# Patient Record
Sex: MALE | Race: WHITE | NOT HISPANIC OR LATINO | Employment: OTHER | URBAN - NONMETROPOLITAN AREA
[De-identification: names, ages, dates, MRNs, and addresses within clinical notes are randomized per-mention and may not be internally consistent; named-entity substitution may affect disease eponyms.]

---

## 2018-03-30 ENCOUNTER — TRANSCRIBE ORDERS (OUTPATIENT)
Dept: CARDIOLOGY | Facility: CLINIC | Age: 63
End: 2018-03-30

## 2018-03-30 DIAGNOSIS — R07.89 OTHER CHEST PAIN: Primary | ICD-10-CM

## 2018-03-30 DIAGNOSIS — I25.2 OLD MYOCARDIAL INFARCTION: ICD-10-CM

## 2018-04-12 ENCOUNTER — CONSULT (OUTPATIENT)
Dept: CARDIOLOGY | Facility: CLINIC | Age: 63
End: 2018-04-12

## 2018-04-12 VITALS
HEIGHT: 72 IN | DIASTOLIC BLOOD PRESSURE: 102 MMHG | WEIGHT: 248 LBS | BODY MASS INDEX: 33.59 KG/M2 | SYSTOLIC BLOOD PRESSURE: 160 MMHG | HEART RATE: 48 BPM

## 2018-04-12 DIAGNOSIS — E78.00 HYPERCHOLESTEREMIA: ICD-10-CM

## 2018-04-12 DIAGNOSIS — R07.89 CHEST FULLNESS: ICD-10-CM

## 2018-04-12 DIAGNOSIS — E11.9 TYPE 2 DIABETES MELLITUS WITHOUT COMPLICATION, WITHOUT LONG-TERM CURRENT USE OF INSULIN (HCC): ICD-10-CM

## 2018-04-12 DIAGNOSIS — M79.10 MYALGIA: ICD-10-CM

## 2018-04-12 DIAGNOSIS — R06.02 SHORTNESS OF BREATH: ICD-10-CM

## 2018-04-12 DIAGNOSIS — I10 ESSENTIAL HYPERTENSION: ICD-10-CM

## 2018-04-12 DIAGNOSIS — I25.9 IHD (ISCHEMIC HEART DISEASE): Primary | ICD-10-CM

## 2018-04-12 DIAGNOSIS — R01.1 MURMUR, CARDIAC: ICD-10-CM

## 2018-04-12 DIAGNOSIS — E88.81 METABOLIC SYNDROME: ICD-10-CM

## 2018-04-12 DIAGNOSIS — R00.1 BRADYCARDIA: ICD-10-CM

## 2018-04-12 PROBLEM — E88.810 METABOLIC SYNDROME: Status: ACTIVE | Noted: 2018-04-12

## 2018-04-12 PROCEDURE — 93000 ELECTROCARDIOGRAM COMPLETE: CPT | Performed by: INTERNAL MEDICINE

## 2018-04-12 PROCEDURE — 99244 OFF/OP CNSLTJ NEW/EST MOD 40: CPT | Performed by: INTERNAL MEDICINE

## 2018-04-12 RX ORDER — LISINOPRIL 10 MG/1
10 TABLET ORAL DAILY
COMMUNITY
End: 2018-04-12

## 2018-04-12 RX ORDER — ATORVASTATIN CALCIUM 40 MG/1
40 TABLET, FILM COATED ORAL DAILY
COMMUNITY

## 2018-04-12 RX ORDER — UBIDECARENONE 200 MG
200 CAPSULE ORAL DAILY
Qty: 90 CAPSULE | Refills: 3 | Status: SHIPPED | OUTPATIENT
Start: 2018-04-12 | End: 2018-07-12 | Stop reason: RX

## 2018-04-12 RX ORDER — NIACIN 500 MG/1
TABLET, EXTENDED RELEASE ORAL
COMMUNITY
End: 2018-04-12

## 2018-04-12 RX ORDER — ASPIRIN 81 MG/1
81 TABLET ORAL DAILY
COMMUNITY

## 2018-04-12 RX ORDER — LISINOPRIL 20 MG/1
20 TABLET ORAL DAILY
Qty: 90 TABLET | Refills: 3 | Status: SHIPPED | OUTPATIENT
Start: 2018-04-12

## 2018-04-12 RX ORDER — ATENOLOL 50 MG/1
50 TABLET ORAL DAILY
COMMUNITY

## 2018-04-12 NOTE — PROGRESS NOTES
CARDIAC COMPLAINTS  chest pressure/discomfort and dyspnea      Subjective   Corby Gauthier is a 62 y.o. male came in today for his initial cardiac evaluation.  He has history of hypertension, diabetes, hypercholesterolemia was diagnosed with ischemic heart disease in 2007.  At that time, he had symptoms of heartburn and shortness of breath.  He was seen by cardiologists from [place] on a Monday and underwent cardiac catheterization the next day.  He was found to have significant left main and 4 vessel disease.  He underwent emergency 4 vessel bypass surgery on the same day.  He has done very well after that without any problems.  He has been followed by the cardiologist from [place] until about 5 years ago.  His last cardiac workup was in 2013 which showed moderate hypertensive blood pressure response and no ischemia.  Recently he started noticing increasing shortness of breath which occurs mostly on exertion and the symptom seems to be progress to getting worse.  He also started noticing burning chest pain along with the fullness in the left part of the chest radiating to the left shoulder and arm.  This occurs anytime of the day with no precipitating factors.  It does occurs more on stress but occurs even at rest also.  He also has diffuse involving both the arms.  It is mostly in the upper part of the arm.  His labs which was done at your office showed the sugar was 147.  Total cholesterol 185,  and A1c was 7.5.  He quit smoking many years ago and he does have family history of ischemic heart disease.    Past Surgical History:   Procedure Laterality Date   • CARDIAC CATHETERIZATION  2007    @ [place].  and 4 more lesions   • CARDIOVASCULAR STRESS TEST  09/23/2013    @[facility]. CV[facility]. 8 Min. 85% THR. BP- 204/77. EF 63%. Negative.   • CORONARY ARTERY BYPASS GRAFT  2007    @ [place].  CABG       Current Outpatient Prescriptions   Medication Sig Dispense Refill   • aspirin 81 MG EC tablet Take 81 mg  by mouth Daily.     • atenolol (TENORMIN) 50 MG tablet Take 50 mg by mouth Daily.     • atorvastatin (LIPITOR) 40 MG tablet Take 40 mg by mouth Daily.     • metFORMIN (GLUCOPHAGE) 1000 MG tablet Take 1,000 mg by mouth 2 (Two) Times a Day With Meals.     • niacin (NIASPAN) 500 MG CR tablet 2 tabs daily     • Co-Enzyme Q10 200 MG capsule Take 200 mg by mouth Daily. 90 capsule 3   • lisinopril (PRINIVIL,ZESTRIL) 20 MG tablet Take 1 tablet by mouth Daily. 90 tablet 3     No current facility-administered medications for this visit.            ALLERGIES:  Review of patient's allergies indicates no known allergies.    Past Medical History:   Diagnosis Date   • Coronary artery disease     s/p 4 vessel CABG in    • Diabetes mellitus    • History of appendectomy    • Hyperlipidemia    • Hypertension        History   Smoking Status   • Former Smoker   • Quit date:    Smokeless Tobacco   • Never Used          Family History   Problem Relation Age of Onset   • Dementia Mother    • No Known Problems Sister    • Heart attack Brother      MI at 44    • Heart attack Maternal Grandfather      multile MI's,  at 54 with MI       Review of Systems   Constitution: Negative for decreased appetite and malaise/fatigue.   HENT: Negative for congestion and sore throat.    Eyes: Negative for blurred vision.   Cardiovascular: Positive for chest pain and dyspnea on exertion.   Respiratory: Positive for shortness of breath. Negative for snoring.    Endocrine: Negative for cold intolerance and heat intolerance.   Hematologic/Lymphatic: Negative for adenopathy. Does not bruise/bleed easily.   Skin: Negative for itching, nail changes and skin cancer.   Musculoskeletal: Positive for myalgias. Negative for arthritis.   Gastrointestinal: Positive for heartburn. Negative for abdominal pain and dysphagia.   Genitourinary: Negative for bladder incontinence and frequency.   Neurological: Negative for dizziness, light-headedness, seizures and  "vertigo.   Psychiatric/Behavioral: Negative for altered mental status.   Allergic/Immunologic: Negative for environmental allergies and hives.       Diabetes- Yes  Thyroid- normal    Objective     BP (!) 160/102 (BP Location: Right arm)   Pulse (!) 48   Ht 182.9 cm (72\")   Wt 112 kg (248 lb)   BMI 33.63 kg/m²     Physical Exam   Constitutional: He is oriented to person, place, and time. He appears well-developed and well-nourished.   HENT:   Head: Normocephalic.   Nose: Nose normal.   Eyes: EOM are normal. Pupils are equal, round, and reactive to light.   Neck: Normal range of motion. Neck supple.   Cardiovascular: Regular rhythm, S1 normal and S2 normal.  Bradycardia present.    Murmur heard.  Pulmonary/Chest: Effort normal and breath sounds normal.   Abdominal: Soft. Bowel sounds are normal.   Musculoskeletal: Normal range of motion. He exhibits no edema.   Neurological: He is alert and oriented to person, place, and time.   Skin: Skin is warm and dry.   Psychiatric: He has a normal mood and affect.         ECG 12 Lead  Date/Time: 4/12/2018 2:29 PM  Performed by: DAVID GOODRICH  Authorized by: DAVID GOODRICH   Previous ECG: no previous ECG available  Rhythm: sinus bradycardia  Rate: bradycardic  QRS axis: normal  Clinical impression: non-specific ECG              Assessment/Plan     Corby was seen today for establish care, shortness of breath and chest pain.    Diagnoses and all orders for this visit:    IHD (ischemic heart disease)  -     Stress Test With Myocardial Perfusion One Day; Future    Essential hypertension  -     lisinopril (PRINIVIL,ZESTRIL) 20 MG tablet; Take 1 tablet by mouth Daily.    Hypercholesteremia    Type 2 diabetes mellitus without complication, without long-term current use of insulin    Bradycardia    Shortness of breath  -     Adult Transthoracic Echo Complete W/ Cont if Necessary Per Protocol; Future    Murmur, cardiac  -     Adult Transthoracic Echo Complete W/ Cont if " Necessary Per Protocol; Future    Chest fullness  -     Stress Test With Myocardial Perfusion One Day; Future    Metabolic syndrome    Myalgia  -     Co-Enzyme Q10 200 MG capsule; Take 200 mg by mouth Daily.       At baseline is slightly bradycardic but hypertensive.  His BMI is 34.  His EKG shows sinus bradycardia with nonspecific ST-T changes.  Apparently his heart rate normally runs between 40-50 and is asymptomatic from that.  I did not change his beta blockers at this time.  I did advise him to increase the dose of lisinopril to 20 mg once a day.  Regarding the myalgia, it could be related to his medications.  I advised him to stop the niacin since most of the studies showed no beneficial effect.  I did put him on coenzyme Q10.  Regarding the chest pain, it is concerning for ischemic heart disease.  I scheduled him to undergo a stress test to evaluate his functional status, chronotropic response and to rule out ischemia.  He also need an echocardiogram to evaluate the LV function, valvular structures.  Based on the results of these tests, further conditions will be made.  I also talked to him about diet exercise and weight reduction.             Electronically signed by Jourdan Dickinson MD April 12, 2018 2:13 PM

## 2018-04-26 ENCOUNTER — HOSPITAL ENCOUNTER (OUTPATIENT)
Dept: CARDIOLOGY | Facility: HOSPITAL | Age: 63
Discharge: HOME | End: 2018-04-26
Attending: INTERNAL MEDICINE

## 2018-04-26 ENCOUNTER — OUTSIDE FACILITY SERVICE (OUTPATIENT)
Dept: CARDIOLOGY | Facility: CLINIC | Age: 63
End: 2018-04-26

## 2018-04-26 DIAGNOSIS — R06.02 SHORTNESS OF BREATH: ICD-10-CM

## 2018-04-26 DIAGNOSIS — R07.89 CHEST FULLNESS: ICD-10-CM

## 2018-04-26 DIAGNOSIS — I25.9 IHD (ISCHEMIC HEART DISEASE): ICD-10-CM

## 2018-04-26 DIAGNOSIS — R01.1 MURMUR, CARDIAC: ICD-10-CM

## 2018-04-26 LAB
MAXIMAL PREDICTED HEART RATE: 159 BPM
MAXIMAL PREDICTED HEART RATE: 159 BPM
STRESS TARGET HR: 135 BPM
STRESS TARGET HR: 135 BPM

## 2018-04-26 PROCEDURE — 93018 CV STRESS TEST I&R ONLY: CPT | Performed by: INTERNAL MEDICINE

## 2018-04-26 PROCEDURE — 93306 TTE W/DOPPLER COMPLETE: CPT | Performed by: INTERNAL MEDICINE

## 2018-04-26 PROCEDURE — 78452 HT MUSCLE IMAGE SPECT MULT: CPT | Performed by: INTERNAL MEDICINE

## 2018-04-26 PROCEDURE — 93306 TTE W/DOPPLER COMPLETE: CPT

## 2018-04-26 PROCEDURE — 93017 CV STRESS TEST TRACING ONLY: CPT

## 2018-04-26 PROCEDURE — 0 TECHNETIUM SESTAMIBI: Performed by: INTERNAL MEDICINE

## 2018-04-26 PROCEDURE — A9500 TC99M SESTAMIBI: HCPCS | Performed by: INTERNAL MEDICINE

## 2018-04-26 PROCEDURE — 78452 HT MUSCLE IMAGE SPECT MULT: CPT

## 2018-04-26 RX ADMIN — TECHNETIUM TC 99M SESTAMIBI 1 DOSE: 1 INJECTION INTRAVENOUS at 08:45

## 2018-05-01 ENCOUNTER — TELEPHONE (OUTPATIENT)
Dept: CARDIOLOGY | Facility: CLINIC | Age: 63
End: 2018-05-01

## 2018-05-01 RX ORDER — HYDROCHLOROTHIAZIDE 25 MG/1
25 TABLET ORAL DAILY
Qty: 90 TABLET | Refills: 3 | Status: SHIPPED | OUTPATIENT
Start: 2018-05-01

## 2018-05-01 NOTE — TELEPHONE ENCOUNTER
Patient aware of stress test and echo results and recommendations.  Negative for ischemia, normal LV function, echo good, hypertensive BP response.  Add HCTZ 25 mg once a day.  Script e-prescribed to [facility] mail order.

## 2018-07-12 ENCOUNTER — OFFICE VISIT (OUTPATIENT)
Dept: CARDIOLOGY | Facility: CLINIC | Age: 63
End: 2018-07-12

## 2018-07-12 VITALS
BODY MASS INDEX: 33.05 KG/M2 | HEART RATE: 64 BPM | HEIGHT: 72 IN | SYSTOLIC BLOOD PRESSURE: 128 MMHG | WEIGHT: 244 LBS | DIASTOLIC BLOOD PRESSURE: 80 MMHG

## 2018-07-12 DIAGNOSIS — I10 ESSENTIAL HYPERTENSION: ICD-10-CM

## 2018-07-12 DIAGNOSIS — E78.00 HYPERCHOLESTEREMIA: ICD-10-CM

## 2018-07-12 DIAGNOSIS — I25.9 IHD (ISCHEMIC HEART DISEASE): Primary | ICD-10-CM

## 2018-07-12 DIAGNOSIS — E88.81 METABOLIC SYNDROME: ICD-10-CM

## 2018-07-12 DIAGNOSIS — E11.9 TYPE 2 DIABETES MELLITUS WITHOUT COMPLICATION, WITHOUT LONG-TERM CURRENT USE OF INSULIN (HCC): ICD-10-CM

## 2018-07-12 PROCEDURE — 99213 OFFICE O/P EST LOW 20 MIN: CPT | Performed by: INTERNAL MEDICINE

## 2018-07-12 NOTE — PROGRESS NOTES
Chief Complaint   Patient presents with   • Follow-up     for cardiac management   • Med Refill     PCP refills meds and monitors labs.    • Medication Problem     pt said there was a problem with filling the Co Q 10, pt did by some OTC but rarely takes it       CARDIAC COMPLAINTS  Cardiac Management        Subjective   Corby Gauthier is a 62 y.o. male came in today for his follow-up visit.  He has history of ischemic heart disease diagnosed in 2007 when he underwent bypass surgery.  He was referred to me with chest discomfort, increasing shortness of breath.  Found to have elevated blood pressure.  His ace inhibitors were increased and he underwent a stress test and echocardiogram.  It showed normal LV function, hypertensive blood pressure response with no evidence of ischemia.  Hydrochlorothiazide was added.  He came today feeling much better.  He shortness of breath is better.  No more chest discomfort.  He has been trying to lose some weight.              Cardiac History  Past Surgical History:   Procedure Laterality Date   • CARDIAC CATHETERIZATION  03/13/2007    @ [place]. 60% LM, 100% LAD, 60% D1, 50% OM1, 50% OM2, 70% RCA, 80% PDA. EF 50%.   • CARDIOVASCULAR STRESS TEST  09/23/2013    @[facility]. CVA. 8 Min. 85% THR. BP- 204/77. EF 63%. Negative.   • CARDIOVASCULAR STRESS TEST  04/26/2018    8 Min,44 secs. 69% THR. BP- 207/71. Negative   • CORONARY ARTERY BYPASS GRAFT  03/13/2007    LIMA to LAD, SVG to OM1, SVG to OM3, SVG to PDA   • ECHO - CONVERTED  04/26/2018    EF 65%       Current Outpatient Prescriptions   Medication Sig Dispense Refill   • aspirin 81 MG EC tablet Take 81 mg by mouth Daily.     • atenolol (TENORMIN) 50 MG tablet Take 50 mg by mouth Daily.     • atorvastatin (LIPITOR) 40 MG tablet Take 40 mg by mouth Daily.     • hydrochlorothiazide (HYDRODIURIL) 25 MG tablet Take 1 tablet by mouth Daily. 90 tablet 3   • lisinopril (PRINIVIL,ZESTRIL) 20 MG tablet Take 1 tablet by mouth Daily. 90 tablet 3   •  metFORMIN (GLUCOPHAGE) 1000 MG tablet Take 1,000 mg by mouth 2 (Two) Times a Day With Meals.       No current facility-administered medications for this visit.        Allergies  :  Patient has no known allergies.       Past Medical History:   Diagnosis Date   • Coronary artery disease     s/p 4 vessel CABG in    • Diabetes mellitus (CMS/HCC)    • History of appendectomy    • Hyperlipidemia    • Hypertension        Social History     Social History   • Marital status:      Spouse name: N/A   • Number of children: N/A   • Years of education: N/A     Occupational History   • Not on file.     Social History Main Topics   • Smoking status: Former Smoker     Quit date:    • Smokeless tobacco: Never Used   • Alcohol use No   • Drug use: No   • Sexual activity: Not on file     Other Topics Concern   • Not on file     Social History Narrative   • No narrative on file       Family History   Problem Relation Age of Onset   • Dementia Mother    • No Known Problems Sister    • Heart attack Brother         MI at 44    • Heart attack Maternal Grandfather         multile MI's,  at 54 with MI       Review of Systems   Constitution: Negative for decreased appetite and malaise/fatigue.   HENT: Negative for congestion and sore throat.    Eyes: Negative for blurred vision.   Cardiovascular: Negative for chest pain and leg swelling.   Respiratory: Negative for shortness of breath and snoring.    Endocrine: Negative for cold intolerance and heat intolerance.   Hematologic/Lymphatic: Negative for adenopathy. Does not bruise/bleed easily.   Skin: Negative for itching, nail changes and skin cancer.   Musculoskeletal: Negative for arthritis and myalgias.   Gastrointestinal: Negative for abdominal pain, dysphagia and heartburn.   Genitourinary: Negative for bladder incontinence and frequency.   Neurological: Negative for dizziness, light-headedness, seizures and vertigo.   Psychiatric/Behavioral: Negative for altered mental  "status.   Allergic/Immunologic: Negative for environmental allergies and hives.       Diabetes- Yes  Thyroid- normal    Objective     /80   Pulse 64   Ht 182.9 cm (72\")   Wt 111 kg (244 lb)   BMI 33.09 kg/m²     Physical Exam   Constitutional: He is oriented to person, place, and time. He appears well-developed and well-nourished.   HENT:   Head: Normocephalic.   Nose: Nose normal.   Eyes: EOM are normal. Pupils are equal, round, and reactive to light.   Neck: Normal range of motion. Neck supple.   Cardiovascular: Normal rate, regular rhythm, S1 normal and S2 normal.    Murmur heard.  Pulmonary/Chest: Breath sounds normal.   Abdominal: Soft. Bowel sounds are normal.   Musculoskeletal: Normal range of motion. He exhibits no edema.   Neurological: He is alert and oriented to person, place, and time.   Skin: Skin is warm and dry.   Psychiatric: He has a normal mood and affect.     Procedures            Assessment/Plan   Patient's Body mass index is 33.09 kg/m². BMI is above normal parameters. Recommendations include: educational material, exercise counseling and nutrition counseling.     Corby was seen today for follow-up, med refill and medication problem.    Diagnoses and all orders for this visit:    IHD (ischemic heart disease)    Essential hypertension    Hypercholesteremia    Type 2 diabetes mellitus without complication, without long-term current use of insulin (CMS/[facility])    Metabolic syndrome       His heart rate and blood pressure appears much better.  His BMI did come down to 33.  I talked to him again about diet, exercise and weight reduction.  I gave him papers regarding the DASH diet.  I talked to him about the stress and echo findings.  Overall his cardiac status appears stable.  I advised him to talk you regarding the lipids.  Reassurance was given.  I will see him back in 6 months or sooner if needed.                  Electronically signed by Jourdan Dickinson MD July 12, 2018 4:14 PM      "

## 2018-07-12 NOTE — PATIENT INSTRUCTIONS
"DASH Eating Plan  DASH stands for \"Dietary Approaches to Stop Hypertension.\" The DASH eating plan is a healthy eating plan that has been shown to reduce high blood pressure (hypertension). It may also reduce your risk for type 2 diabetes, heart disease, and stroke. The DASH eating plan may also help with weight loss.  What are tips for following this plan?  General guidelines  · Avoid eating more than 2,300 mg (milligrams) of salt (sodium) a day. If you have hypertension, you may need to reduce your sodium intake to 1,500 mg a day.  · Limit alcohol intake to no more than 1 drink a day for nonpregnant women and 2 drinks a day for men. One drink equals 12 oz of beer, 5 oz of wine, or 1½ oz of hard liquor.  · Work with your health care provider to maintain a healthy body weight or to lose weight. Ask what an ideal weight is for you.  · Get at least 30 minutes of exercise that causes your heart to beat faster (aerobic exercise) most days of the week. Activities may include walking, swimming, or biking.  · Work with your health care provider or diet and nutrition specialist (dietitian) to adjust your eating plan to your individual calorie needs.  Reading food labels  · Check food labels for the amount of sodium per serving. Choose foods with less than 5 percent of the Daily Value of sodium. Generally, foods with less than 300 mg of sodium per serving fit into this eating plan.  · To find whole grains, look for the word \"whole\" as the first word in the ingredient list.  Shopping  · Buy products labeled as \"low-sodium\" or \"no salt added.\"  · Buy fresh foods. Avoid canned foods and premade or frozen meals.  Cooking  · Avoid adding salt when cooking. Use salt-free seasonings or herbs instead of table salt or sea salt. Check with your health care provider or pharmacist before using salt substitutes.  · Do not conti foods. Cook foods using healthy methods such as baking, boiling, grilling, and broiling instead.  · Cook with " heart-healthy oils, such as olive, canola, soybean, or sunflower oil.  Meal planning    · Eat a balanced diet that includes:  ? 5 or more servings of fruits and vegetables each day. At each meal, try to fill half of your plate with fruits and vegetables.  ? Up to 6-8 servings of whole grains each day.  ? Less than 6 oz of lean meat, poultry, or fish each day. A 3-oz serving of meat is about the same size as a deck of cards. One egg equals 1 oz.  ? 2 servings of low-fat dairy each day.  ? A serving of nuts, seeds, or beans 5 times each week.  ? Heart-healthy fats. Healthy fats called Omega-3 fatty acids are found in foods such as flaxseeds and coldwater fish, like sardines, salmon, and mackerel.  · Limit how much you eat of the following:  ? Canned or prepackaged foods.  ? Food that is high in trans fat, such as fried foods.  ? Food that is high in saturated fat, such as fatty meat.  ? Sweets, desserts, sugary drinks, and other foods with added sugar.  ? Full-fat dairy products.  · Do not salt foods before eating.  · Try to eat at least 2 vegetarian meals each week.  · Eat more home-cooked food and less restaurant, buffet, and fast food.  · When eating at a restaurant, ask that your food be prepared with less salt or no salt, if possible.  What foods are recommended?  The items listed may not be a complete list. Talk with your dietitian about what dietary choices are best for you.  Grains  Whole-grain or whole-wheat bread. Whole-grain or whole-wheat pasta. Brown rice. Oatmeal. Quinoa. Bulgur. Whole-grain and low-sodium cereals. Wilma bread. Low-fat, low-sodium crackers. Whole-wheat flour tortillas.  Vegetables  Fresh or frozen vegetables (raw, steamed, roasted, or grilled). Low-sodium or reduced-sodium tomato and vegetable juice. Low-sodium or reduced-sodium tomato sauce and tomato paste. Low-sodium or reduced-sodium canned vegetables.  Fruits  All fresh, dried, or frozen fruit. Canned fruit in natural juice (without  added sugar).  Meat and other protein foods  Skinless chicken or turkey. Ground chicken or turkey. Pork with fat trimmed off. Fish and seafood. Egg whites. Dried beans, peas, or lentils. Unsalted nuts, nut butters, and seeds. Unsalted canned beans. Lean cuts of beef with fat trimmed off. Low-sodium, lean deli meat.  Dairy  Low-fat (1%) or fat-free (skim) milk. Fat-free, low-fat, or reduced-fat cheeses. Nonfat, low-sodium ricotta or cottage cheese. Low-fat or nonfat yogurt. Low-fat, low-sodium cheese.  Fats and oils  Soft margarine without trans fats. Vegetable oil. Low-fat, reduced-fat, or light mayonnaise and salad dressings (reduced-sodium). Canola, safflower, olive, soybean, and sunflower oils. Avocado.  Seasoning and other foods  Herbs. Spices. Seasoning mixes without salt. Unsalted popcorn and pretzels. Fat-free sweets.  What foods are not recommended?  The items listed may not be a complete list. Talk with your dietitian about what dietary choices are best for you.  Grains  Baked goods made with fat, such as croissants, muffins, or some breads. Dry pasta or rice meal packs.  Vegetables  Creamed or fried vegetables. Vegetables in a cheese sauce. Regular canned vegetables (not low-sodium or reduced-sodium). Regular canned tomato sauce and paste (not low-sodium or reduced-sodium). Regular tomato and vegetable juice (not low-sodium or reduced-sodium). Pickles. Olives.  Fruits  Canned fruit in a light or heavy syrup. Fried fruit. Fruit in cream or butter sauce.  Meat and other protein foods  Fatty cuts of meat. Ribs. Fried meat. Bonner. Sausage. Bologna and other processed lunch meats. Salami. Fatback. Hotdogs. Bratwurst. Salted nuts and seeds. Canned beans with added salt. Canned or smoked fish. Whole eggs or egg yolks. Chicken or turkey with skin.  Dairy  Whole or 2% milk, cream, and half-and-half. Whole or full-fat cream cheese. Whole-fat or sweetened yogurt. Full-fat cheese. Nondairy creamers. Whipped toppings.  Processed cheese and cheese spreads.  Fats and oils  Butter. Stick margarine. Lard. Shortening. Ghee. Bonner fat. Tropical oils, such as coconut, palm kernel, or palm oil.  Seasoning and other foods  Salted popcorn and pretzels. Onion salt, garlic salt, seasoned salt, table salt, and sea salt. Worcestershire sauce. Tartar sauce. Barbecue sauce. Teriyaki sauce. Soy sauce, including reduced-sodium. Steak sauce. Canned and packaged gravies. Fish sauce. Oyster sauce. Cocktail sauce. Horseradish that you find on the shelf. Ketchup. Mustard. Meat flavorings and tenderizers. Bouillon cubes. Hot sauce and Tabasco sauce. Premade or packaged marinades. Premade or packaged taco seasonings. Relishes. Regular salad dressings.  Where to find more information:  · National Heart, Lung, and Blood [place]: www.nhlbi.nih.gov  · American Heart Association: www.heart.org  Summary  · The DASH eating plan is a healthy eating plan that has been shown to reduce high blood pressure (hypertension). It may also reduce your risk for type 2 diabetes, heart disease, and stroke.  · With the DASH eating plan, you should limit salt (sodium) intake to 2,300 mg a day. If you have hypertension, you may need to reduce your sodium intake to 1,500 mg a day.  · When on the DASH eating plan, aim to eat more fresh fruits and vegetables, whole grains, lean proteins, low-fat dairy, and heart-healthy fats.  · Work with your health care provider or diet and nutrition specialist (dietitian) to adjust your eating plan to your individual calorie needs.  This information is not intended to replace advice given to you by your health care provider. Make sure you discuss any questions you have with your health care provider.  Document Released: 12/06/2012 Document Revised: 12/11/2017 Document Reviewed: 12/11/2017  [employer] Interactive Patient Education © 2018 [employer]

## 2023-06-19 ENCOUNTER — OFFICE VISIT (OUTPATIENT)
Dept: CARDIOLOGY | Facility: CLINIC | Age: 68
End: 2023-06-19
Payer: MEDICARE

## 2023-06-19 VITALS
BODY MASS INDEX: 31.59 KG/M2 | DIASTOLIC BLOOD PRESSURE: 76 MMHG | HEIGHT: 72 IN | SYSTOLIC BLOOD PRESSURE: 110 MMHG | WEIGHT: 233.2 LBS | HEART RATE: 55 BPM

## 2023-06-19 DIAGNOSIS — E78.00 HYPERCHOLESTEREMIA: ICD-10-CM

## 2023-06-19 DIAGNOSIS — R06.02 SHORTNESS OF BREATH: ICD-10-CM

## 2023-06-19 DIAGNOSIS — I25.9 IHD (ISCHEMIC HEART DISEASE): Primary | ICD-10-CM

## 2023-06-19 DIAGNOSIS — R42 DIZZINESS: ICD-10-CM

## 2023-06-19 DIAGNOSIS — E11.9 TYPE 2 DIABETES MELLITUS WITHOUT COMPLICATION, WITHOUT LONG-TERM CURRENT USE OF INSULIN: ICD-10-CM

## 2023-06-19 DIAGNOSIS — I10 ESSENTIAL HYPERTENSION: ICD-10-CM

## 2023-06-19 DIAGNOSIS — I20.8 STABLE ANGINA PECTORIS: ICD-10-CM

## 2023-06-19 RX ORDER — ISOSORBIDE MONONITRATE 30 MG/1
30 TABLET, EXTENDED RELEASE ORAL DAILY
COMMUNITY

## 2023-06-19 NOTE — LETTER
June 19, 2023       No Recipients    Patient: Corby Gauthier   YOB: 1955   Date of Visit: 6/19/2023       Dear   No Recipients,    Thank you for referring oCrby Gauthier to me for evaluation. Below is a copy of my consult note.    If you have questions, please do not hesitate to call me. I look forward to following Corby along with you.         Sincerely,        ALISON Will        CC:   No Recipients    Subjective     Chief Complaint   Patient presents with   • Establish Care     Patient wishing to re-establish care, has CAD.   • Chest Pain     He reports starting Ozempic about 4 months ago, noticed some chest discomfort. Noticed intermittent discomfort for 2-3 weeks. Noticed a discomfort also in left arm.     Initial cardiac evaluation;    HPI    Corby Gauthier is a 67-year-old male with HTN, hyperlipidemia, diabetes and IHD diagnosed in 2007 when he underwent four-vessel CABG in [place].  He followed with cardiology there until he was referred here in 2018.  Stress test on 4/30/2018 showed good exercise tolerance, hypertensive blood pressure response, normal LVEF and no ischemia.  HCTZ 25 mg added.      He returns to today to reestablish care after not being seen since 2018.  He is retired from a very stressful job as  for [employer].  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  He is recently has noticed increasing chest pressure radiating to left arm.  Not related to exertion or eating.  Pain is mild to moderate.  Does not use sublingual nitro.  He started Ozempic around the same time, questioning possible relationship.  He was given Imdur by PCP which has improved symptoms somewhat.  Labs 1/20/2023 showed normal CBC, GFR greater than 60, normal electrolytes, A1c 7.9%, , , HDL 38, LDL 87.  He does not drink alcohol.  He quit smoking in 1992.    Cardiac History  Past Surgical History:   Procedure Laterality Date   • CARDIAC CATHETERIZATION  03/13/2007     @ [place]. 60% LM, 100% LAD, 60% D1, 50% OM1, 50% OM2, 70% RCA, 80% PDA. EF 50%.   • CARDIOVASCULAR STRESS TEST  2013    @[facility]. CVA. 8 Min. 85% THR. BP- 204/77. EF 63%. Negative.   • CARDIOVASCULAR STRESS TEST  2018    8 Min,44 secs. 69% THR. BP- 207/71. Negative   • CORONARY ARTERY BYPASS GRAFT  2007    LIMA to LAD, SVG to OM1, SVG to OM3, SVG to PDA   • ECHO - CONVERTED  2018    EF 65%     Current Outpatient Medications   Medication Sig Dispense Refill   • aspirin 81 MG EC tablet Take 1 tablet by mouth Daily.     • atenolol (TENORMIN) 50 MG tablet Take 1 tablet by mouth Daily.     • atorvastatin (LIPITOR) 40 MG tablet Take 1 tablet by mouth Daily.     • GLUCOSAMINE-CHONDROITIN PO Take  by mouth Daily.     • isosorbide mononitrate (IMDUR) 30 MG 24 hr tablet Take 1 tablet by mouth Daily.     • lisinopril (PRINIVIL,ZESTRIL) 20 MG tablet Take 1 tablet by mouth Daily. 90 tablet 3   • Probiotic Product (PROBIOTIC-10 PO) Take  by mouth Daily.     • Semaglutide (OZEMPIC, 0.25 OR 0.5 MG/DOSE, SC) Inject  under the skin into the appropriate area as directed 1 (One) Time Per Week.     • hydrochlorothiazide (HYDRODIURIL) 25 MG tablet Take 1 tablet by mouth Daily. 90 tablet 3     No current facility-administered medications for this visit.     Patient has no known allergies.    Past Medical History:   Diagnosis Date   • Coronary artery disease     s/p 4 vessel CABG in    • Diabetes mellitus    • History of appendectomy    • Hyperlipidemia    • Hypertension      Social History     Socioeconomic History   • Marital status:    Tobacco Use   • Smoking status: Former     Packs/day: 1.00     Years: 18.00     Pack years: 18.00     Types: Cigarettes     Quit date:      Years since quittin.4   • Smokeless tobacco: Never   Vaping Use   • Vaping Use: Never used   Substance and Sexual Activity   • Alcohol use: No   • Drug use: No   • Sexual activity: Defer     Family History   Problem Relation  "Age of Onset   • Hypertension Mother    • Dementia Mother    • Hypertension Father    • Hyperlipidemia Father    • Dementia Father    • No Known Problems Sister    • Heart attack Brother         MI at 46   • Hypertension Brother    • Heart attack Maternal Grandfather         multilelvia MCGRAW's,  at 57 with MI     Review of Systems   Constitutional: Positive for appetite change (Decreased with Ozempic) and fatigue.   HENT: Negative.    Eyes: Negative.    Respiratory: Positive for chest tightness.    Cardiovascular: Positive for chest pain and leg swelling (Left leg, significant varicosity). Negative for palpitations.   Gastrointestinal: Negative.    Endocrine: Negative.    Genitourinary: Negative.    Musculoskeletal: Positive for arthralgias (Knee pain).   Skin: Negative.    Allergic/Immunologic: Negative.    Neurological: Negative.    Hematological: Negative.    Psychiatric/Behavioral: Negative.    All other systems reviewed and are negative.      Diabetes- Yes  Thyroid- normal    Objective     /76 (BP Location: Left arm)   Pulse 55   Ht 182.9 cm (72\")   Wt 106 kg (233 lb 3.2 oz)   BMI 31.63 kg/m²     Physical Exam  Vitals and nursing note reviewed.   Constitutional:       Appearance: Normal appearance.   HENT:      Head: Normocephalic and atraumatic.      Right Ear: External ear normal.      Left Ear: External ear normal.      Nose: Nose normal.   Eyes:      General: No scleral icterus.     Extraocular Movements: Extraocular movements intact.      Pupils: Pupils are equal, round, and reactive to light.   Neck:      Vascular: No carotid bruit.   Cardiovascular:      Rate and Rhythm: Regular rhythm. Bradycardia present.      Pulses: Normal pulses.      Heart sounds: Murmur heard.   Pulmonary:      Effort: Pulmonary effort is normal.      Breath sounds: Normal breath sounds.   Abdominal:      General: Abdomen is flat.   Musculoskeletal:         General: No swelling.      Cervical back: Normal range of motion. "   Skin:     General: Skin is warm and dry.      Capillary Refill: Capillary refill takes less than 2 seconds.   Neurological:      General: No focal deficit present.      Mental Status: He is alert and oriented to person, place, and time.   Psychiatric:         Mood and Affect: Mood normal.         Behavior: Behavior normal.         ECG 12 Lead    Date/Time: 6/19/2023 10:36 AM  Performed by: Yudelka Gregg APRN  Authorized by: Yudelka Gregg APRN   Comparison: compared with previous ECG   Rhythm: sinus rhythm and sinus bradycardia  Rate: bradycardic  ST Segments: ST segments normal    Clinical impression: non-specific ECG  Comments:  ms  QRS 86 ms  QTc 401 ms            Assessment & Plan     Diagnoses and all orders for this visit:    1. IHD (ischemic heart disease) (Primary)  -     Stress Test With Myocardial Perfusion One Day; Future  -     Adult Transthoracic Echo Complete W/ Cont if Necessary Per Protocol; Future  -     Comprehensive Metabolic Panel; Future  -     CBC (No Diff); Future  -     US Carotid Bilateral; Future    2. Essential hypertension  -     Stress Test With Myocardial Perfusion One Day; Future  -     Adult Transthoracic Echo Complete W/ Cont if Necessary Per Protocol; Future  -     TSH; Future  -     Comprehensive Metabolic Panel; Future  -     CBC (No Diff); Future  -     US Carotid Bilateral; Future    3. Hypercholesteremia  -     Stress Test With Myocardial Perfusion One Day; Future  -     Adult Transthoracic Echo Complete W/ Cont if Necessary Per Protocol; Future  -     Lipid Panel; Future  -     High Sensitivity CRP; Future  -     Comprehensive Metabolic Panel; Future  -     CBC (No Diff); Future  -     US Carotid Bilateral; Future    4. Type 2 diabetes mellitus without complication, without long-term current use of insulin  -     Stress Test With Myocardial Perfusion One Day; Future  -     Adult Transthoracic Echo Complete W/ Cont if Necessary Per Protocol; Future  -     Hemoglobin A1c;  Future  -     Comprehensive Metabolic Panel; Future  -     CBC (No Diff); Future    5. Shortness of breath  -     Stress Test With Myocardial Perfusion One Day; Future  -     Adult Transthoracic Echo Complete W/ Cont if Necessary Per Protocol; Future  -     Comprehensive Metabolic Panel; Future  -     CBC (No Diff); Future    6. Stable angina pectoris  -     Stress Test With Myocardial Perfusion One Day; Future  -     Adult Transthoracic Echo Complete W/ Cont if Necessary Per Protocol; Future  -     Comprehensive Metabolic Panel; Future  -     CBC (No Diff); Future    7. Dizziness  -     US Carotid Bilateral; Future    Other orders  -     ECG 12 Lead       Clinical exam reveals normal S1, S2 with soft holosystolic murmur at the mitral area, slightly bradycardic at 55.  EKG showed sinus bradycardia with normal KS and QT intervals.  Nonspecific ST changes, flattening of the T wave.  Left radial pulse slightly weak.  No pedal edema.  Significant varicosity from knee to ankle on left lower extremity.    HTN-blood pressure well controlled at 110/76.  Continue atenolol, lisinopril; upon reviewing list, appears HCTZ has been discontinued.    Hypercholesterolemia- suboptimal control of lipids with elevated triglycerides at 263, Ozempic started for diabetes management.  Hopefully triglycerides have improved as well.  We will repeat lipids.  If triglycerides have increased further, will consider adding Zetia or fenofibrate.  Mediterranean style diet, increasing healthy fats encouraged.    Diabetes-A1c 7.9%.  Ozempic started.  He is on aspirin, statin, ACE.    CAD-s/p CABG x4 in 2007.  Negative stress test in 2018.  Imdur has been beneficial since starting.  Recommend nuclear stress test and echocardiogram to reassess LVEF, coronary artery perfusion, valvular structures.    Carotid ultrasound to rule out carotid or vertebral stenosis.  Will evaluate for retrograde flow indicating subclavian stenosis with weakened left radial  pulse.    No changes made today.  Further recommendations to follow.  We will see him back in 6 months or sooner if testing is abnormal.

## 2023-06-19 NOTE — PROGRESS NOTES
Subjective     Chief Complaint   Patient presents with   • Establish Care     Patient wishing to re-establish care, has CAD.   • Chest Pain     He reports starting Ozempic about 4 months ago, noticed some chest discomfort. Noticed intermittent discomfort for 2-3 weeks. Noticed a discomfort also in left arm.     Initial cardiac evaluation;    HPI    Corby Gauthier is a 67-year-old male with HTN, hyperlipidemia, diabetes and IHD diagnosed in 2007 when he underwent four-vessel CABG in [place].  He followed with cardiology there until he was referred here in 2018.  Stress test on 4/30/2018 showed good exercise tolerance, hypertensive blood pressure response, normal LVEF and no ischemia.  HCTZ 25 mg added.      He returns to today to reestablish care after not being seen since 2018.  He is retired from a very stressful job as  for [employer].  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  He is recently has noticed increasing chest pressure radiating to left arm.  Not related to exertion or eating.  Pain is mild to moderate.  Does not use sublingual nitro.  He started Ozempic around the same time, questioning possible relationship.  He was given Imdur by PCP which has improved symptoms somewhat.  Labs 1/20/2023 showed normal CBC, GFR greater than 60, normal electrolytes, A1c 7.9%, , , HDL 38, LDL 87.  He does not drink alcohol.  He quit smoking in 1992.    Cardiac History  Past Surgical History:   Procedure Laterality Date   • CARDIAC CATHETERIZATION  03/13/2007    @ [place]. 60% LM, 100% LAD, 60% D1, 50% OM1, 50% OM2, 70% RCA, 80% PDA. EF 50%.   • CARDIOVASCULAR STRESS TEST  09/23/2013    @[facility]. CVA. 8 Min. 85% THR. BP- 204/77. EF 63%. Negative.   • CARDIOVASCULAR STRESS TEST  04/26/2018    8 Min,44 secs. 69% THR. BP- 207/71. Negative   • CORONARY ARTERY BYPASS GRAFT  03/13/2007    LIMA to LAD, SVG to OM1, SVG to OM3, SVG to PDA   • ECHO - CONVERTED  04/26/2018    EF 65%     Current  Outpatient Medications   Medication Sig Dispense Refill   • aspirin 81 MG EC tablet Take 1 tablet by mouth Daily.     • atenolol (TENORMIN) 50 MG tablet Take 1 tablet by mouth Daily.     • atorvastatin (LIPITOR) 40 MG tablet Take 1 tablet by mouth Daily.     • GLUCOSAMINE-CHONDROITIN PO Take  by mouth Daily.     • isosorbide mononitrate (IMDUR) 30 MG 24 hr tablet Take 1 tablet by mouth Daily.     • lisinopril (PRINIVIL,ZESTRIL) 20 MG tablet Take 1 tablet by mouth Daily. 90 tablet 3   • Probiotic Product (PROBIOTIC-10 PO) Take  by mouth Daily.     • Semaglutide (OZEMPIC, 0.25 OR 0.5 MG/DOSE, SC) Inject  under the skin into the appropriate area as directed 1 (One) Time Per Week.     • hydrochlorothiazide (HYDRODIURIL) 25 MG tablet Take 1 tablet by mouth Daily. 90 tablet 3     No current facility-administered medications for this visit.     Patient has no known allergies.    Past Medical History:   Diagnosis Date   • Coronary artery disease     s/p 4 vessel CABG in    • Diabetes mellitus    • History of appendectomy    • Hyperlipidemia    • Hypertension      Social History     Socioeconomic History   • Marital status:    Tobacco Use   • Smoking status: Former     Packs/day: 1.00     Years: 18.00     Pack years: 18.00     Types: Cigarettes     Quit date:      Years since quittin.4   • Smokeless tobacco: Never   Vaping Use   • Vaping Use: Never used   Substance and Sexual Activity   • Alcohol use: No   • Drug use: No   • Sexual activity: Defer     Family History   Problem Relation Age of Onset   • Hypertension Mother    • Dementia Mother    • Hypertension Father    • Hyperlipidemia Father    • Dementia Father    • No Known Problems Sister    • Heart attack Brother         MI at 46   • Hypertension Brother    • Heart attack Maternal Grandfather         multile MI's,  at 57 with MI     Review of Systems   Constitutional: Positive for appetite change (Decreased with Ozempic) and fatigue.   HENT:  "Negative.    Eyes: Negative.    Respiratory: Positive for chest tightness.    Cardiovascular: Positive for chest pain and leg swelling (Left leg, significant varicosity). Negative for palpitations.   Gastrointestinal: Negative.    Endocrine: Negative.    Genitourinary: Negative.    Musculoskeletal: Positive for arthralgias (Knee pain).   Skin: Negative.    Allergic/Immunologic: Negative.    Neurological: Negative.    Hematological: Negative.    Psychiatric/Behavioral: Negative.    All other systems reviewed and are negative.      Diabetes- Yes  Thyroid- normal    Objective     /76 (BP Location: Left arm)   Pulse 55   Ht 182.9 cm (72\")   Wt 106 kg (233 lb 3.2 oz)   BMI 31.63 kg/m²     Physical Exam  Vitals and nursing note reviewed.   Constitutional:       Appearance: Normal appearance.   HENT:      Head: Normocephalic and atraumatic.      Right Ear: External ear normal.      Left Ear: External ear normal.      Nose: Nose normal.   Eyes:      General: No scleral icterus.     Extraocular Movements: Extraocular movements intact.      Pupils: Pupils are equal, round, and reactive to light.   Neck:      Vascular: No carotid bruit.   Cardiovascular:      Rate and Rhythm: Regular rhythm. Bradycardia present.      Pulses: Normal pulses.      Heart sounds: Murmur heard.   Pulmonary:      Effort: Pulmonary effort is normal.      Breath sounds: Normal breath sounds.   Abdominal:      General: Abdomen is flat.   Musculoskeletal:         General: No swelling.      Cervical back: Normal range of motion.   Skin:     General: Skin is warm and dry.      Capillary Refill: Capillary refill takes less than 2 seconds.   Neurological:      General: No focal deficit present.      Mental Status: He is alert and oriented to person, place, and time.   Psychiatric:         Mood and Affect: Mood normal.         Behavior: Behavior normal.         ECG 12 Lead    Date/Time: 6/19/2023 10:36 AM  Performed by: Yudelka Gregg, " ALISON  Authorized by: Yudelka Gregg APRN   Comparison: compared with previous ECG   Rhythm: sinus rhythm and sinus bradycardia  Rate: bradycardic  ST Segments: ST segments normal    Clinical impression: non-specific ECG  Comments:  ms  QRS 86 ms  QTc 401 ms            Assessment & Plan     Diagnoses and all orders for this visit:    1. IHD (ischemic heart disease) (Primary)  -     Stress Test With Myocardial Perfusion One Day; Future  -     Adult Transthoracic Echo Complete W/ Cont if Necessary Per Protocol; Future  -     Comprehensive Metabolic Panel; Future  -     CBC (No Diff); Future  -     US Carotid Bilateral; Future    2. Essential hypertension  -     Stress Test With Myocardial Perfusion One Day; Future  -     Adult Transthoracic Echo Complete W/ Cont if Necessary Per Protocol; Future  -     TSH; Future  -     Comprehensive Metabolic Panel; Future  -     CBC (No Diff); Future  -     US Carotid Bilateral; Future    3. Hypercholesteremia  -     Stress Test With Myocardial Perfusion One Day; Future  -     Adult Transthoracic Echo Complete W/ Cont if Necessary Per Protocol; Future  -     Lipid Panel; Future  -     High Sensitivity CRP; Future  -     Comprehensive Metabolic Panel; Future  -     CBC (No Diff); Future  -     US Carotid Bilateral; Future    4. Type 2 diabetes mellitus without complication, without long-term current use of insulin  -     Stress Test With Myocardial Perfusion One Day; Future  -     Adult Transthoracic Echo Complete W/ Cont if Necessary Per Protocol; Future  -     Hemoglobin A1c; Future  -     Comprehensive Metabolic Panel; Future  -     CBC (No Diff); Future    5. Shortness of breath  -     Stress Test With Myocardial Perfusion One Day; Future  -     Adult Transthoracic Echo Complete W/ Cont if Necessary Per Protocol; Future  -     Comprehensive Metabolic Panel; Future  -     CBC (No Diff); Future    6. Stable angina pectoris  -     Stress Test With Myocardial Perfusion One Day;  Future  -     Adult Transthoracic Echo Complete W/ Cont if Necessary Per Protocol; Future  -     Comprehensive Metabolic Panel; Future  -     CBC (No Diff); Future    7. Dizziness  -     US Carotid Bilateral; Future    Other orders  -     ECG 12 Lead       Clinical exam reveals normal S1, S2 with soft holosystolic murmur at the mitral area, slightly bradycardic at 55.  EKG showed sinus bradycardia with normal TX and QT intervals.  Nonspecific ST changes, flattening of the T wave.  Left radial pulse slightly weak.  No pedal edema.  Significant varicosity from knee to ankle on left lower extremity.    HTN-blood pressure well controlled at 110/76.  Continue atenolol, lisinopril; upon reviewing list, appears HCTZ has been discontinued.    Hypercholesterolemia- suboptimal control of lipids with elevated triglycerides at 263, Ozempic started for diabetes management.  Hopefully triglycerides have improved as well.  We will repeat lipids.  If triglycerides have increased further, will consider adding Zetia or fenofibrate.  Mediterranean style diet, increasing healthy fats encouraged.    Diabetes-A1c 7.9%.  Ozempic started.  He is on aspirin, statin, ACE.    CAD-s/p CABG x4 in 2007.  Negative stress test in 2018.  Imdur has been beneficial since starting.  Recommend nuclear stress test and echocardiogram to reassess LVEF, coronary artery perfusion, valvular structures.    Carotid ultrasound to rule out carotid or vertebral stenosis.  Will evaluate for retrograde flow indicating subclavian stenosis with weakened left radial pulse.    No changes made today.  Further recommendations to follow.  We will see him back in 6 months or sooner if testing is abnormal.

## 2023-06-19 NOTE — LETTER
June 19, 2023       No Recipients    Patient: Corby Gauthier   YOB: 1955   Date of Visit: 6/19/2023       Dear ALISON Schrader,    Thank you for referring Corby Gauthier to me for evaluation. Below is a copy of my consult note.    If you have questions, please do not hesitate to call me. I look forward to following Corby along with you.         Sincerely,        ALISON Will        CC:   No Recipients    Subjective     Chief Complaint   Patient presents with   • Establish Care     Patient wishing to re-establish care, has CAD.   • Chest Pain     He reports starting Ozempic about 4 months ago, noticed some chest discomfort. Noticed intermittent discomfort for 2-3 weeks. Noticed a discomfort also in left arm.     Initial cardiac evaluation;    HPI    Corby Gauthier is a 67-year-old male with HTN, hyperlipidemia, diabetes and IHD diagnosed in 2007 when he underwent four-vessel CABG in [place].  He followed with cardiology there until he was referred here in 2018.  Stress test on 4/30/2018 showed good exercise tolerance, hypertensive blood pressure response, normal LVEF and no ischemia.  HCTZ 25 mg added.      He returns to today to reestablish care after not being seen since 2018.  He is retired from a very stressful job as  for [employer].  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  He is recently has noticed increasing chest pressure radiating to left arm.  Not related to exertion or eating.  Pain is mild to moderate.  Does not use sublingual nitro.  He started Ozempic around the same time, questioning possible relationship.  He was given Imdur by PCP which has improved symptoms somewhat.  Labs 1/20/2023 showed normal CBC, GFR greater than 60, normal electrolytes, A1c 7.9%, , , HDL 38, LDL 87.  He does not drink alcohol.  He quit smoking in 1992.    Cardiac History  Past Surgical History:   Procedure Laterality Date   • CARDIAC CATHETERIZATION  03/13/2007     @ [place]. 60% LM, 100% LAD, 60% D1, 50% OM1, 50% OM2, 70% RCA, 80% PDA. EF 50%.   • CARDIOVASCULAR STRESS TEST  2013    @[facility]. CVA. 8 Min. 85% THR. BP- 204/77. EF 63%. Negative.   • CARDIOVASCULAR STRESS TEST  2018    8 Min,44 secs. 69% THR. BP- 207/71. Negative   • CORONARY ARTERY BYPASS GRAFT  2007    LIMA to LAD, SVG to OM1, SVG to OM3, SVG to PDA   • ECHO - CONVERTED  2018    EF 65%     Current Outpatient Medications   Medication Sig Dispense Refill   • aspirin 81 MG EC tablet Take 1 tablet by mouth Daily.     • atenolol (TENORMIN) 50 MG tablet Take 1 tablet by mouth Daily.     • atorvastatin (LIPITOR) 40 MG tablet Take 1 tablet by mouth Daily.     • GLUCOSAMINE-CHONDROITIN PO Take  by mouth Daily.     • isosorbide mononitrate (IMDUR) 30 MG 24 hr tablet Take 1 tablet by mouth Daily.     • lisinopril (PRINIVIL,ZESTRIL) 20 MG tablet Take 1 tablet by mouth Daily. 90 tablet 3   • Probiotic Product (PROBIOTIC-10 PO) Take  by mouth Daily.     • Semaglutide (OZEMPIC, 0.25 OR 0.5 MG/DOSE, SC) Inject  under the skin into the appropriate area as directed 1 (One) Time Per Week.     • hydrochlorothiazide (HYDRODIURIL) 25 MG tablet Take 1 tablet by mouth Daily. 90 tablet 3     No current facility-administered medications for this visit.     Patient has no known allergies.    Past Medical History:   Diagnosis Date   • Coronary artery disease     s/p 4 vessel CABG in    • Diabetes mellitus    • History of appendectomy    • Hyperlipidemia    • Hypertension      Social History     Socioeconomic History   • Marital status:    Tobacco Use   • Smoking status: Former     Packs/day: 1.00     Years: 18.00     Pack years: 18.00     Types: Cigarettes     Quit date:      Years since quittin.4   • Smokeless tobacco: Never   Vaping Use   • Vaping Use: Never used   Substance and Sexual Activity   • Alcohol use: No   • Drug use: No   • Sexual activity: Defer     Family History   Problem Relation  "Age of Onset   • Hypertension Mother    • Dementia Mother    • Hypertension Father    • Hyperlipidemia Father    • Dementia Father    • No Known Problems Sister    • Heart attack Brother         MI at 46   • Hypertension Brother    • Heart attack Maternal Grandfather         multilelvia MCGRAW's,  at 57 with MI     Review of Systems   Constitutional: Positive for appetite change (Decreased with Ozempic) and fatigue.   HENT: Negative.    Eyes: Negative.    Respiratory: Positive for chest tightness.    Cardiovascular: Positive for chest pain and leg swelling (Left leg, significant varicosity). Negative for palpitations.   Gastrointestinal: Negative.    Endocrine: Negative.    Genitourinary: Negative.    Musculoskeletal: Positive for arthralgias (Knee pain).   Skin: Negative.    Allergic/Immunologic: Negative.    Neurological: Negative.    Hematological: Negative.    Psychiatric/Behavioral: Negative.    All other systems reviewed and are negative.      Diabetes- Yes  Thyroid- normal    Objective     /76 (BP Location: Left arm)   Pulse 55   Ht 182.9 cm (72\")   Wt 106 kg (233 lb 3.2 oz)   BMI 31.63 kg/m²     Physical Exam  Vitals and nursing note reviewed.   Constitutional:       Appearance: Normal appearance.   HENT:      Head: Normocephalic and atraumatic.      Right Ear: External ear normal.      Left Ear: External ear normal.      Nose: Nose normal.   Eyes:      General: No scleral icterus.     Extraocular Movements: Extraocular movements intact.      Pupils: Pupils are equal, round, and reactive to light.   Neck:      Vascular: No carotid bruit.   Cardiovascular:      Rate and Rhythm: Regular rhythm. Bradycardia present.      Pulses: Normal pulses.      Heart sounds: Murmur heard.   Pulmonary:      Effort: Pulmonary effort is normal.      Breath sounds: Normal breath sounds.   Abdominal:      General: Abdomen is flat.   Musculoskeletal:         General: No swelling.      Cervical back: Normal range of motion. "   Skin:     General: Skin is warm and dry.      Capillary Refill: Capillary refill takes less than 2 seconds.   Neurological:      General: No focal deficit present.      Mental Status: He is alert and oriented to person, place, and time.   Psychiatric:         Mood and Affect: Mood normal.         Behavior: Behavior normal.         ECG 12 Lead    Date/Time: 6/19/2023 10:36 AM  Performed by: Yudelka Gregg APRN  Authorized by: Yudelka Gregg APRN   Comparison: compared with previous ECG   Rhythm: sinus rhythm and sinus bradycardia  Rate: bradycardic  ST Segments: ST segments normal    Clinical impression: non-specific ECG  Comments:  ms  QRS 86 ms  QTc 401 ms            Assessment & Plan     Diagnoses and all orders for this visit:    1. IHD (ischemic heart disease) (Primary)  -     Stress Test With Myocardial Perfusion One Day; Future  -     Adult Transthoracic Echo Complete W/ Cont if Necessary Per Protocol; Future  -     Comprehensive Metabolic Panel; Future  -     CBC (No Diff); Future  -     US Carotid Bilateral; Future    2. Essential hypertension  -     Stress Test With Myocardial Perfusion One Day; Future  -     Adult Transthoracic Echo Complete W/ Cont if Necessary Per Protocol; Future  -     TSH; Future  -     Comprehensive Metabolic Panel; Future  -     CBC (No Diff); Future  -     US Carotid Bilateral; Future    3. Hypercholesteremia  -     Stress Test With Myocardial Perfusion One Day; Future  -     Adult Transthoracic Echo Complete W/ Cont if Necessary Per Protocol; Future  -     Lipid Panel; Future  -     High Sensitivity CRP; Future  -     Comprehensive Metabolic Panel; Future  -     CBC (No Diff); Future  -     US Carotid Bilateral; Future    4. Type 2 diabetes mellitus without complication, without long-term current use of insulin  -     Stress Test With Myocardial Perfusion One Day; Future  -     Adult Transthoracic Echo Complete W/ Cont if Necessary Per Protocol; Future  -     Hemoglobin A1c;  Future  -     Comprehensive Metabolic Panel; Future  -     CBC (No Diff); Future    5. Shortness of breath  -     Stress Test With Myocardial Perfusion One Day; Future  -     Adult Transthoracic Echo Complete W/ Cont if Necessary Per Protocol; Future  -     Comprehensive Metabolic Panel; Future  -     CBC (No Diff); Future    6. Stable angina pectoris  -     Stress Test With Myocardial Perfusion One Day; Future  -     Adult Transthoracic Echo Complete W/ Cont if Necessary Per Protocol; Future  -     Comprehensive Metabolic Panel; Future  -     CBC (No Diff); Future    7. Dizziness  -     US Carotid Bilateral; Future    Other orders  -     ECG 12 Lead       Clinical exam reveals normal S1, S2 with soft holosystolic murmur at the mitral area, slightly bradycardic at 55.  EKG showed sinus bradycardia with normal NJ and QT intervals.  Nonspecific ST changes, flattening of the T wave.  Left radial pulse slightly weak.  No pedal edema.  Significant varicosity from knee to ankle on left lower extremity.    HTN-blood pressure well controlled at 110/76.  Continue atenolol, lisinopril, HCTZ.    Hypercholesterolemia- suboptimal control of lipids with elevated triglycerides at 263, Ozempic started for diabetes management.  Hopefully triglycerides have improved as well.  We will repeat lipids.  If triglycerides have increased further, will consider adding Zetia or fenofibrate.  Mediterranean style diet, increasing healthy fats encouraged.    Diabetes-A1c 7.9%.  Ozempic started.  He is on aspirin, statin, ACE.    CAD-s/p CABG x4 in 2007.  Negative stress test in 2018.  Imdur has been beneficial since starting.  Recommend nuclear stress test and echocardiogram to reassess LVEF, coronary artery perfusion, valvular structures.    Carotid ultrasound to rule out carotid or vertebral stenosis.  Will evaluate for retrograde flow indicating subclavian stenosis with weakened left radial pulse.    No changes made today.  Further  recommendations to follow.  We will see him back in 6 months or sooner if testing is abnormal.

## 2024-09-12 ENCOUNTER — TELEPHONE (OUTPATIENT)
Dept: CARDIOLOGY | Facility: CLINIC | Age: 69
End: 2024-09-12
Payer: MEDICARE

## 2024-09-12 DIAGNOSIS — I25.9 IHD (ISCHEMIC HEART DISEASE): Primary | ICD-10-CM

## 2024-09-12 DIAGNOSIS — I10 ESSENTIAL HYPERTENSION: ICD-10-CM

## 2024-09-12 DIAGNOSIS — R06.02 SHORTNESS OF BREATH: ICD-10-CM

## 2024-09-27 NOTE — PROGRESS NOTES
Chief Complaint   Patient presents with    Follow-up     For cardiac management, Pt states that is SOA but denies having and chest pains, dizziness,palpitations. Labs done August 28th 2024 in chart. Pt had stents put in 5 weeks ago     Med Refill     90 day refills on cardiac medications to [facility]. Pt takes aspirin        Cardiac Complaints  dyspnea      Subjective   Corby Gauthier is a 69 y.o. male with HTN, hyperlipidemia, diabetes, and IHD diagnosed in 2007 when he underwent four-vessel CABG in [place].  He followed with cardiology there until he was referred here in 2018.  Stress test on 4/30/2018 showed good exercise tolerance, hypertensive blood pressure response, normal LVEF and no ischemia.  HCTZ 25 mg added.  Workup 2023 showed apical ischemia, norvasc added, cath advised with persistent symptoms. He had not been seen since 2023 but went to [facility] 8/2024 with intermittent chest pain, EKG showed ST elevation in inferolateral wall. He then went to the cath lab where only the NAYAN to LAD was patent. The RCA had a total occlusion past PDA with no collateral filling and scar noted. The left main had 80% stenosis of left circumflex and a proximal 80% to 85% stenosis. The left main and left circumflex was stented, the chest pain improved and EKG showed no ST elevation.  Cath 8/26/2024 EF 50-55%, left main 80% reduced to 0% after a 4.5x22mm lashon stent from the left main into the proximal left circumflex, chronic total occlusion of RCA. Echo post cath 8/27/2024 showed EF 60%, mild MR.    Patient comes today for follow up and admits to SOA. No CP, dizziness, palpitations, or syncope noted.   He does have SOA, but reports some SOA but nothing significant, not taking Brilinta with caffeine. Labs 8/2024 showed: HH 13.4/41.2, Trop I >25,000, Na 136, K 4.2, BUN 14, Creatinine 1.16, GFR 69, LDL 98, HDL 45, TSH 2.957. Refills requested for 90 day supply.      Cardiac History  Past Surgical History:   Procedure  Laterality Date    CARDIAC CATHETERIZATION  03/13/2007    @ [place]. 60% LM, 100% LAD, 60% D1, 50% OM1, 50% OM2, 70% RCA, 80% PDA. EF 50%.    CARDIAC CATHETERIZATION  08/26/2024    EF 55%, left main 80% into proximal left circumflex, stented with 4.5x22mm lashon stent    CARDIOVASCULAR STRESS TEST  09/23/2013    @[facility]. CVA. 8 Min. 85% THR. BP- 204/77. EF 63%. Negative.    CARDIOVASCULAR STRESS TEST  04/26/2018    8 Min,44 secs. 69% THR. BP- 207/71. Negative    CARDIOVASCULAR STRESS TEST  07/06/2023    Lexiscan- EF 61%. 176/102. Apical Ischemia    CORONARY ARTERY BYPASS GRAFT  03/13/2007    LIMA to LAD, SVG to OM1, SVG to OM3, SVG to PDA    ECHO - CONVERTED  04/26/2018    EF 65%    ECHO - CONVERTED  07/06/2023    TLS. EF 60%. LA- 4.3. Trace-Mild MR    ECHO - CONVERTED  08/27/2024    EF 60%, mild MR       Current Outpatient Medications   Medication Sig Dispense Refill    aspirin 81 MG EC tablet Take 1 tablet by mouth Daily.      Brilinta 90 MG tablet tablet Take 1 tablet by mouth 2 (Two) Times a Day. 180 tablet 2    GLUCOSAMINE-CHONDROITIN PO Take  by mouth Daily.      isosorbide mononitrate (IMDUR) 30 MG 24 hr tablet Take 1 tablet by mouth Daily. 90 tablet 2    levothyroxine (SYNTHROID, LEVOTHROID) 25 MCG tablet Take 1 tablet by mouth Every Morning.      metoprolol succinate XL (TOPROL-XL) 25 MG 24 hr tablet Take 1 tablet by mouth Daily. 90 tablet 2    Probiotic Product (PROBIOTIC-10 PO) Take  by mouth Daily.      Semaglutide (OZEMPIC, 0.25 OR 0.5 MG/DOSE, SC) Inject  under the skin into the appropriate area as directed 1 (One) Time Per Week.      atorvastatin (LIPITOR) 80 MG tablet Take 1 tablet by mouth Daily. 90 tablet 2     No current facility-administered medications for this visit.       Patient has no known allergies.    Past Medical History:   Diagnosis Date    Coronary artery disease     s/p 4 vessel CABG in 2007    Diabetes mellitus     History of appendectomy     Hyperlipidemia     Hypertension        Social  "History     Socioeconomic History    Marital status:    Tobacco Use    Smoking status: Former     Current packs/day: 0.00     Average packs/day: 1 pack/day for 18.0 years (18.0 ttl pk-yrs)     Types: Cigarettes     Start date:      Quit date:      Years since quittin.7    Smokeless tobacco: Never   Vaping Use    Vaping status: Never Used   Substance and Sexual Activity    Alcohol use: No    Drug use: No    Sexual activity: Defer       Family History   Problem Relation Age of Onset    Hypertension Mother     Dementia Mother     Hypertension Father     Hyperlipidemia Father     Dementia Father     No Known Problems Sister     Heart attack Brother         MI at 46    Hypertension Brother     Heart attack Maternal Grandfather         joselin MI's,  at 57 with MI       Review of Systems   Constitutional: Negative for malaise/fatigue and night sweats.   Cardiovascular:  Positive for dyspnea on exertion. Negative for chest pain, claudication, irregular heartbeat, leg swelling, near-syncope, orthopnea, palpitations and syncope.   Respiratory:  Positive for shortness of breath. Negative for cough and wheezing.    Musculoskeletal:  Positive for stiffness. Negative for back pain and joint pain.   Gastrointestinal:  Negative for anorexia, heartburn, nausea and vomiting.   Genitourinary:  Negative for dysuria, hematuria, hesitancy and nocturia.   Neurological:  Negative for dizziness, headaches and light-headedness.   Psychiatric/Behavioral:  Negative for depression and memory loss. The patient is not nervous/anxious.            Objective     /60 (BP Location: Left arm)   Pulse 62   Ht 182.9 cm (72\")   Wt 106 kg (233 lb)   BMI 31.60 kg/m²     Constitutional:       Appearance: Not in distress.   Eyes:      Pupils: Pupils are equal, round, and reactive to light.   HENT:      Nose: Nose normal.   Pulmonary:      Effort: Pulmonary effort is normal.      Breath sounds: Normal breath sounds. "   Cardiovascular:      PMI at left midclavicular line. Normal rate. Regular rhythm.      Murmurs: There is a systolic murmur.   Abdominal:      Palpations: Abdomen is soft.   Musculoskeletal: Normal range of motion.      Cervical back: Normal range of motion and neck supple. Skin:     General: Skin is warm and dry.   Neurological:      Mental Status: Alert.         Procedures         Diagnoses and all orders for this visit:    1. IHD (ischemic heart disease) (Primary)    2. Essential hypertension    3. Shortness of breath    4. Hypercholesteremia    5. Type 2 diabetes mellitus without complication, without long-term current use of insulin    6. Obesity (BMI 30.0-34.9)    Other orders  -     Discontinue: atorvastatin (LIPITOR) 40 MG tablet; Take 2 tablets by mouth Daily.  Dispense: 90 tablet; Refill: 2  -     Brilinta 90 MG tablet tablet; Take 1 tablet by mouth 2 (Two) Times a Day.  Dispense: 180 tablet; Refill: 2  -     isosorbide mononitrate (IMDUR) 30 MG 24 hr tablet; Take 1 tablet by mouth Daily.  Dispense: 90 tablet; Refill: 2  -     metoprolol succinate XL (TOPROL-XL) 25 MG 24 hr tablet; Take 1 tablet by mouth Daily.  Dispense: 90 tablet; Refill: 2  -     atorvastatin (LIPITOR) 80 MG tablet; Take 1 tablet by mouth Daily.  Dispense: 90 tablet; Refill: 2             IHD: Recent stenting after MI this summer. Will continue DAPT. Bleed and bruise denied. Continue imdur for chest pain management. Will be urged to take his Brilinta     HTN: BP is stable. Continue BB at same.    Hyperlipidemia: Taking statin therapy with lipitor. Will continue same, increased at [facility]. LDL less than 100. Limited carb diet urged.    DM: Taking ozempic therapy. AIC with you. Limited carb diet urged.    Refills per request    BMI noted at 31.6, good cardiac ADA diet urged.    6 month follow up urged, sooner if needed.              Problems Addressed this Visit          Cardiac and Vasculature    IHD (ischemic heart disease) - Primary     Relevant Medications    Brilinta 90 MG tablet tablet    isosorbide mononitrate (IMDUR) 30 MG 24 hr tablet    metoprolol succinate XL (TOPROL-XL) 25 MG 24 hr tablet    Essential hypertension    Relevant Medications    metoprolol succinate XL (TOPROL-XL) 25 MG 24 hr tablet    Hypercholesteremia    Relevant Medications    atorvastatin (LIPITOR) 80 MG tablet       Endocrine and Metabolic    Type 2 diabetes mellitus without complication, without long-term current use of insulin       Pulmonary and Pneumonias    Shortness of breath     Other Visit Diagnoses       Obesity (BMI 30.0-34.9)              Diagnoses         Codes Comments    IHD (ischemic heart disease)    -  Primary ICD-10-CM: I25.9  ICD-9-CM: 414.9     Essential hypertension     ICD-10-CM: I10  ICD-9-CM: 401.9     Shortness of breath     ICD-10-CM: R06.02  ICD-9-CM: 786.05     Hypercholesteremia     ICD-10-CM: E78.00  ICD-9-CM: 272.0     Type 2 diabetes mellitus without complication, without long-term current use of insulin     ICD-10-CM: E11.9  ICD-9-CM: 250.00     Obesity (BMI 30.0-34.9)     ICD-10-CM: E66.9  ICD-9-CM: 278.00                        Electronically signed by Josie Garvey, ALISON September 30, 2024 08:44 EDT

## 2024-09-30 ENCOUNTER — OFFICE VISIT (OUTPATIENT)
Dept: CARDIOLOGY | Facility: CLINIC | Age: 69
End: 2024-09-30
Payer: MEDICARE

## 2024-09-30 VITALS
HEIGHT: 72 IN | SYSTOLIC BLOOD PRESSURE: 105 MMHG | BODY MASS INDEX: 31.56 KG/M2 | DIASTOLIC BLOOD PRESSURE: 60 MMHG | HEART RATE: 62 BPM | WEIGHT: 233 LBS

## 2024-09-30 DIAGNOSIS — E11.9 TYPE 2 DIABETES MELLITUS WITHOUT COMPLICATION, WITHOUT LONG-TERM CURRENT USE OF INSULIN: ICD-10-CM

## 2024-09-30 DIAGNOSIS — E66.811 OBESITY (BMI 30.0-34.9): ICD-10-CM

## 2024-09-30 DIAGNOSIS — R06.02 SHORTNESS OF BREATH: ICD-10-CM

## 2024-09-30 DIAGNOSIS — E78.00 HYPERCHOLESTEREMIA: ICD-10-CM

## 2024-09-30 DIAGNOSIS — I10 ESSENTIAL HYPERTENSION: ICD-10-CM

## 2024-09-30 DIAGNOSIS — I25.9 IHD (ISCHEMIC HEART DISEASE): Primary | ICD-10-CM

## 2024-09-30 PROCEDURE — 3074F SYST BP LT 130 MM HG: CPT | Performed by: NURSE PRACTITIONER

## 2024-09-30 PROCEDURE — 3078F DIAST BP <80 MM HG: CPT | Performed by: NURSE PRACTITIONER

## 2024-09-30 PROCEDURE — 99214 OFFICE O/P EST MOD 30 MIN: CPT | Performed by: NURSE PRACTITIONER

## 2024-09-30 RX ORDER — ATORVASTATIN CALCIUM 80 MG/1
80 TABLET, FILM COATED ORAL DAILY
Qty: 90 TABLET | Refills: 2 | Status: SHIPPED | OUTPATIENT
Start: 2024-09-30

## 2024-09-30 RX ORDER — METOPROLOL SUCCINATE 25 MG/1
25 TABLET, EXTENDED RELEASE ORAL DAILY
Qty: 90 TABLET | Refills: 2 | Status: SHIPPED | OUTPATIENT
Start: 2024-09-30

## 2024-09-30 RX ORDER — LEVOTHYROXINE SODIUM 25 UG/1
25 TABLET ORAL
COMMUNITY
Start: 2024-07-24

## 2024-09-30 RX ORDER — TICAGRELOR 90 MG/1
90 TABLET ORAL 2 TIMES DAILY
Qty: 180 TABLET | Refills: 2 | Status: SHIPPED | OUTPATIENT
Start: 2024-09-30

## 2024-09-30 RX ORDER — METOPROLOL SUCCINATE 25 MG/1
25 TABLET, EXTENDED RELEASE ORAL DAILY
COMMUNITY
Start: 2024-09-10 | End: 2024-09-30 | Stop reason: SDUPTHER

## 2024-09-30 RX ORDER — ISOSORBIDE MONONITRATE 30 MG/1
30 TABLET, EXTENDED RELEASE ORAL DAILY
Qty: 90 TABLET | Refills: 2 | Status: SHIPPED | OUTPATIENT
Start: 2024-09-30

## 2024-09-30 RX ORDER — TICAGRELOR 90 MG/1
90 TABLET ORAL 2 TIMES DAILY
COMMUNITY
Start: 2024-09-10 | End: 2024-09-30 | Stop reason: SDUPTHER

## 2024-09-30 RX ORDER — ATORVASTATIN CALCIUM 40 MG/1
80 TABLET, FILM COATED ORAL DAILY
Qty: 90 TABLET | Refills: 2 | Status: SHIPPED | OUTPATIENT
Start: 2024-09-30 | End: 2024-09-30

## 2025-04-15 ENCOUNTER — OFFICE VISIT (OUTPATIENT)
Dept: CARDIOLOGY | Facility: CLINIC | Age: 70
End: 2025-04-15
Payer: MEDICARE

## 2025-04-15 VITALS
BODY MASS INDEX: 29.39 KG/M2 | SYSTOLIC BLOOD PRESSURE: 132 MMHG | HEART RATE: 60 BPM | HEIGHT: 72 IN | WEIGHT: 217 LBS | DIASTOLIC BLOOD PRESSURE: 84 MMHG

## 2025-04-15 DIAGNOSIS — I25.9 IHD (ISCHEMIC HEART DISEASE): Primary | ICD-10-CM

## 2025-04-15 DIAGNOSIS — R06.02 SHORTNESS OF BREATH: ICD-10-CM

## 2025-04-15 DIAGNOSIS — E78.00 HYPERCHOLESTEREMIA: ICD-10-CM

## 2025-04-15 DIAGNOSIS — I10 ESSENTIAL HYPERTENSION: ICD-10-CM

## 2025-04-15 PROCEDURE — 3075F SYST BP GE 130 - 139MM HG: CPT | Performed by: NURSE PRACTITIONER

## 2025-04-15 PROCEDURE — 3079F DIAST BP 80-89 MM HG: CPT | Performed by: NURSE PRACTITIONER

## 2025-04-15 PROCEDURE — 99214 OFFICE O/P EST MOD 30 MIN: CPT | Performed by: NURSE PRACTITIONER

## 2025-04-15 RX ORDER — ATORVASTATIN CALCIUM 80 MG/1
80 TABLET, FILM COATED ORAL DAILY
Qty: 90 TABLET | Refills: 3 | Status: SHIPPED | OUTPATIENT
Start: 2025-04-15

## 2025-04-15 RX ORDER — TICAGRELOR 90 MG/1
90 TABLET ORAL 2 TIMES DAILY
Qty: 180 TABLET | Refills: 3 | Status: CANCELLED | OUTPATIENT
Start: 2025-04-15

## 2025-04-15 RX ORDER — LISINOPRIL 20 MG/1
20 TABLET ORAL DAILY
COMMUNITY

## 2025-04-15 RX ORDER — ISOSORBIDE MONONITRATE 30 MG/1
30 TABLET, EXTENDED RELEASE ORAL DAILY
Qty: 90 TABLET | Refills: 3 | Status: SHIPPED | OUTPATIENT
Start: 2025-04-15

## 2025-04-15 RX ORDER — METOPROLOL SUCCINATE 25 MG/1
25 TABLET, EXTENDED RELEASE ORAL DAILY
Qty: 90 TABLET | Refills: 3 | Status: SHIPPED | OUTPATIENT
Start: 2025-04-15

## 2025-04-15 RX ORDER — CLOPIDOGREL BISULFATE 75 MG/1
75 TABLET ORAL DAILY
Qty: 90 TABLET | Refills: 3 | Status: SHIPPED | OUTPATIENT
Start: 2025-04-15

## 2025-04-15 NOTE — PROGRESS NOTES
Chief Complaint   Patient presents with    Follow-up     For cardiac management, still has some SOB but doesn't feel like it's any worse than it was. Otherwise denies any cardiac problems. Recently had a bout with the flu that lasted a couple weeks. Lost about 15 lbs on the Ozempic.    Labs      Most recent labs from Aug is in chart.     Med Refill     Refills needed on cardiac meds.  90 days to [facility].     Hypertension     PCP has added Lisinopril since last visit. BP doing much better.         HPI:  HPI   Corby Gauthier is a 69 y.o. male with HTN, hyperlipidemia, diabetes, and IHD diagnosed in 2007 when he underwent four-vessel CABG in [place].  He followed with cardiology there until he was referred here in 2018.  Stress test on 4/30/2018 showed good exercise tolerance, hypertensive blood pressure response, normal LVEF and no ischemia.  HCTZ 25 mg added.  Workup 2023 showed apical ischemia, norvasc added, cath advised with persistent symptoms. He had not been seen since 2023 but went to [facility] 8/2024 with intermittent chest pain, EKG showed ST elevation in inferolateral wall. He then went to the cath lab where only the NAYAN to LAD was patent. The RCA had a total occlusion past PDA with no collateral filling and scar noted. The left main had 80% stenosis of left circumflex and a proximal 80% to 85% stenosis. The left main and left circumflex was stented, the chest pain improved and EKG showed no ST elevation.  Cath 8/26/2024 EF 50-55%, left main 80% reduced to 0% after a 4.5x22mm lashon stent from the left main into the proximal left circumflex, chronic total occlusion of RCA. Echo post cath 8/27/2024 showed EF 60%, mild MR.     Patient comes today for follow up. Labs 8/2024 showed: HH 13.4/41.2, Trop I >25,000, Na 136, K 4.2, BUN 14, Creatinine 1.16, GFR 69, LDL 98, HDL 45, TSH 2.957.  Patient denies chest pain, pressure, palpitations, tightness, dizziness. He is still experiencing SOB which has not worsened. He  has consumed caffeine with Brilinta and this has not helped.     Cardiac History:    Past Surgical History:   Procedure Laterality Date    CARDIAC CATHETERIZATION  03/13/2007    @ [facility] 60% LM, 100% LAD, 60% D1, 50% OM1, 50% OM2, 70% RCA, 80% PDA. EF 50%.    CARDIAC CATHETERIZATION  08/26/2024    EF 55%, left main 80% into proximal left circumflex, stented with 4.5x22mm lashon stent    CARDIOVASCULAR STRESS TEST  09/23/2013    @[facility]. CVA. 8 Min. 85% THR. BP- 204/77. EF 63%. Negative.    CARDIOVASCULAR STRESS TEST  04/26/2018    8 Min,44 secs. 69% THR. BP- 207/71. Negative    CARDIOVASCULAR STRESS TEST  07/06/2023    Lexiscan- EF 61%. 176/102. Apical Ischemia    CORONARY ARTERY BYPASS GRAFT  03/13/2007    LIMA to LAD, SVG to OM1, SVG to OM3, SVG to PDA    ECHO - CONVERTED  04/26/2018    EF 65%    ECHO - CONVERTED  07/06/2023    TLS. EF 60%. LA- 4.3. Trace-Mild MR    ECHO - CONVERTED  08/27/2024    EF 60%, mild MR       Current Outpatient Medications   Medication Sig Dispense Refill    aspirin 81 MG EC tablet Take 1 tablet by mouth Daily.      atorvastatin (LIPITOR) 80 MG tablet Take 1 tablet by mouth Daily. 90 tablet 3    GLUCOSAMINE-CHONDROITIN PO Take  by mouth Daily.      isosorbide mononitrate (IMDUR) 30 MG 24 hr tablet Take 1 tablet by mouth Daily. 90 tablet 3    levothyroxine (SYNTHROID, LEVOTHROID) 25 MCG tablet Take 1 tablet by mouth Every Morning.      lisinopril (PRINIVIL,ZESTRIL) 20 MG tablet Take 1 tablet by mouth Daily.      metoprolol succinate XL (TOPROL-XL) 25 MG 24 hr tablet Take 1 tablet by mouth Daily. 90 tablet 3    Probiotic Product (PROBIOTIC-10 PO) Take  by mouth Daily.      Semaglutide (OZEMPIC, 0.25 OR 0.5 MG/DOSE, SC) Inject  under the skin into the appropriate area as directed 1 (One) Time Per Week.      clopidogrel (PLAVIX) 75 MG tablet Take 1 tablet by mouth Daily. 90 tablet 3     No current facility-administered medications for this visit.       Patient has no known allergies.    Past  "Medical History:   Diagnosis Date    Coronary artery disease     s/p 4 vessel CABG in 2007    Diabetes mellitus     History of appendectomy     Hyperlipidemia     Hypertension        Social History     Socioeconomic History    Marital status:    Tobacco Use    Smoking status: Former     Current packs/day: 0.00     Average packs/day: 1 pack/day for 18.0 years (18.0 ttl pk-yrs)     Types: Cigarettes     Start date:      Quit date:      Years since quittin.3    Smokeless tobacco: Never   Vaping Use    Vaping status: Never Used   Substance and Sexual Activity    Alcohol use: No    Drug use: No    Sexual activity: Defer       Family History   Problem Relation Age of Onset    Hypertension Mother     Dementia Mother     Hypertension Father     Hyperlipidemia Father     Dementia Father     No Known Problems Sister     Heart attack Brother         MI at 46    Hypertension Brother     Heart attack Maternal Grandfather         multile MI's,  at 57 with MI       Vitals:   /84   Pulse 60   Ht 182.9 cm (72\")   Wt 98.4 kg (217 lb)   BMI 29.43 kg/m²     Physical Exam  Vitals and nursing note reviewed.   Neck:      Vascular: No carotid bruit.   Cardiovascular:      Rate and Rhythm: Normal rate and regular rhythm.      Pulses: Normal pulses.      Heart sounds: Normal heart sounds. No murmur heard.     No friction rub. No gallop.   Pulmonary:      Effort: Pulmonary effort is normal.      Breath sounds: Normal breath sounds and air entry.   Musculoskeletal:      Right lower leg: No edema.      Left lower leg: No edema.   Skin:     General: Skin is warm and dry.      Capillary Refill: Capillary refill takes less than 2 seconds.   Neurological:      Mental Status: He is alert and oriented to person, place, and time.       Procedures     Assessment & Plan     Diagnoses and all orders for this visit:    1. IHD (ischemic heart disease) (Primary)  -     isosorbide mononitrate (IMDUR) 30 MG 24 hr tablet; Take " 1 tablet by mouth Daily.  Dispense: 90 tablet; Refill: 3    2. Essential hypertension    3. Hypercholesteremia  -     atorvastatin (LIPITOR) 80 MG tablet; Take 1 tablet by mouth Daily.  Dispense: 90 tablet; Refill: 3    4. Shortness of breath    Other orders  -     metoprolol succinate XL (TOPROL-XL) 25 MG 24 hr tablet; Take 1 tablet by mouth Daily.  Dispense: 90 tablet; Refill: 3  -     clopidogrel (PLAVIX) 75 MG tablet; Take 1 tablet by mouth Daily.  Dispense: 90 tablet; Refill: 3    IHD  - Heart cath 8/2024 stenting left main into proximal left circumflex  - Switch Brilinta to Plavix 2/2 SOB  - Continue aspirin and atorvastatin and Imdur    Hypertension  - BP controlled  - Continue Imdur, lisinopril, Toprol    Hypercholesteremia  - Labs managed with PCP  - Continue current statin therapy    SOB  - Switch Brilinta to Plavix    Stable from a cardiac standpoint. No further testing recommended at this time. Change Brilinta to Plavix. Refilled cardiac meds.      Visit Diagnoses:    ICD-10-CM ICD-9-CM   1. IHD (ischemic heart disease)  I25.9 414.9   2. Essential hypertension  I10 401.9   3. Hypercholesteremia  E78.00 272.0   4. Shortness of breath  R06.02 786.05       Meds Ordered During Visit:  New Medications Ordered This Visit   Medications    atorvastatin (LIPITOR) 80 MG tablet     Sig: Take 1 tablet by mouth Daily.     Dispense:  90 tablet     Refill:  3    isosorbide mononitrate (IMDUR) 30 MG 24 hr tablet     Sig: Take 1 tablet by mouth Daily.     Dispense:  90 tablet     Refill:  3    metoprolol succinate XL (TOPROL-XL) 25 MG 24 hr tablet     Sig: Take 1 tablet by mouth Daily.     Dispense:  90 tablet     Refill:  3    clopidogrel (PLAVIX) 75 MG tablet     Sig: Take 1 tablet by mouth Daily.     Dispense:  90 tablet     Refill:  3       Follow Up Appointment:   Return in about 6 months (around 10/15/2025), or if symptoms worsen or fail to improve.           This document has been electronically signed by Africa  Laura, APRN  April 15, 2025 11:29 EDT    Dictated Utilizing Dragon Dictation: Part of this note may be an electronic transcription/translation of spoken language to printed text using the Dragon Dictation System.